# Patient Record
Sex: MALE | Race: WHITE | NOT HISPANIC OR LATINO | URBAN - METROPOLITAN AREA
[De-identification: names, ages, dates, MRNs, and addresses within clinical notes are randomized per-mention and may not be internally consistent; named-entity substitution may affect disease eponyms.]

---

## 2019-03-20 ENCOUNTER — EMERGENCY (EMERGENCY)
Facility: HOSPITAL | Age: 34
LOS: 1 days | Discharge: HOME | End: 2019-03-20
Admitting: EMERGENCY MEDICINE
Payer: SELF-PAY

## 2019-03-20 VITALS
DIASTOLIC BLOOD PRESSURE: 75 MMHG | RESPIRATION RATE: 18 BRPM | OXYGEN SATURATION: 97 % | WEIGHT: 149.91 LBS | TEMPERATURE: 98 F | HEART RATE: 80 BPM | SYSTOLIC BLOOD PRESSURE: 150 MMHG | HEIGHT: 65 IN
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
## 2019-03-20 DIAGNOSIS — S61.012A LACERATION WITHOUT FOREIGN BODY OF LEFT THUMB WITHOUT DAMAGE TO NAIL, INITIAL ENCOUNTER: ICD-10-CM
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
## 2019-03-20 DIAGNOSIS — Z23 ENCOUNTER FOR IMMUNIZATION: ICD-10-CM
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
## 2019-03-20 PROCEDURE — 90471 IMMUNIZATION ADMIN: CPT

## 2019-03-20 PROCEDURE — 12001 RPR S/N/AX/GEN/TRNK 2.5CM/<: CPT

## 2019-03-20 PROCEDURE — 99282 EMERGENCY DEPT VISIT SF MDM: CPT | Mod: 25

## 2019-03-20 PROCEDURE — 90715 TDAP VACCINE 7 YRS/> IM: CPT

## 2019-03-20 PROCEDURE — 99283 EMERGENCY DEPT VISIT LOW MDM: CPT | Mod: 25

## 2019-03-20 RX ORDER — TETANUS TOXOID, REDUCED DIPHTHERIA TOXOID AND ACELLULAR PERTUSSIS VACCINE, ADSORBED 5; 2.5; 8; 8; 2.5 [IU]/.5ML; [IU]/.5ML; UG/.5ML; UG/.5ML; UG/.5ML
0.5 SUSPENSION INTRAMUSCULAR ONCE
Qty: 0 | Refills: 0 | Status: COMPLETED | OUTPATIENT
Start: 2019-03-20 | End: 2019-03-20

## 2019-03-20 RX ORDER — LIDOCAINE HCL 20 MG/ML
10 VIAL (ML) INJECTION ONCE
Qty: 0 | Refills: 0 | Status: COMPLETED | OUTPATIENT
Start: 2019-03-20 | End: 2019-03-20

## 2019-03-20 RX ADMIN — TETANUS TOXOID, REDUCED DIPHTHERIA TOXOID AND ACELLULAR PERTUSSIS VACCINE, ADSORBED 0.5 MILLILITER(S): 5; 2.5; 8; 8; 2.5 SUSPENSION INTRAMUSCULAR at 15:11

## 2019-03-20 RX ADMIN — Medication 10 MILLILITER(S): at 15:13

## 2019-03-20 NOTE — ED PROVIDER NOTE - CLINICAL SUMMARY MEDICAL DECISION MAKING FREE TEXT BOX
1cm linear laceration over IP joint of L thumb. Tdap updated. Digital block performed for anesthesia and laceration closed without complication. Aluminum finger splint applied since laceration over IP joint. Work excuse provided. Will return in 1 week for suture removal. Strict return precautions given.

## 2019-03-20 NOTE — ED ADULT NURSE NOTE - CHPI ED NUR SYMPTOMS NEG
no drainage/no blood in mucus/no rectal pain/no vomiting/no fever/no chills/no purulent drainage/no redness

## 2019-03-20 NOTE — ED PROVIDER NOTE - OBJECTIVE STATEMENT
32yo RHD male with no pmhx presents with L thumb laceration while using hole saw today. No numbness or weakness. Has FROM. No other injuries. Last tetanus unknown.

## 2019-03-20 NOTE — ED PROVIDER NOTE - PHYSICAL EXAMINATION
VITAL SIGNS: I have reviewed nursing notes and confirm.  CONSTITUTIONAL: Well-developed; well-nourished; in no acute distress.   SKIN:  warm and dry, no acute rash.   HEAD:  normocephalic, atraumatic.  EYES: PERRL, EOM intact; conjunctiva and sclera clear.  ENT: No nasal discharge; airway clear.   NECK: Supple; non tender.  EXT: Normal ROM. No clubbing, cyanosis or edema. 2+ pulses to b/l ue/le.  NEURO: Alert, oriented, grossly unremarkable  PSYCH: Cooperative, mood and affect appropriate.    L hand: 1cm linear laceration over IP joint of L thumb. No evidence of tendon injury. Distal sensation intact. Cap refill <2 sec.

## 2019-03-20 NOTE — ED PROVIDER NOTE - NSFOLLOWUPINSTRUCTIONS_ED_ALL_ED_FT
Keep bandage in place for 24 hours then remove and wash with warm, soapy water. Repeat this each day.  Replace aluminum finger splint to prevent bending your thumb and popping the sutures.  Follow up in 1 week for suture removal.    Return to the Emergency department if you develop fever>100.4F, finger swelling, numbness, weakness or any other concerns

## 2025-07-07 ENCOUNTER — HOSPITAL ENCOUNTER (INPATIENT)
Dept: HOSPITAL 74 - JER | Age: 40
LOS: 2 days | Discharge: HOME | DRG: 720 | End: 2025-07-09
Attending: INTERNAL MEDICINE | Admitting: HOSPITALIST
Payer: SELF-PAY

## 2025-07-07 VITALS — BODY MASS INDEX: 26.9 KG/M2

## 2025-07-07 DIAGNOSIS — J01.90: ICD-10-CM

## 2025-07-07 DIAGNOSIS — D72.829: ICD-10-CM

## 2025-07-07 DIAGNOSIS — R61: ICD-10-CM

## 2025-07-07 DIAGNOSIS — H92.09: ICD-10-CM

## 2025-07-07 DIAGNOSIS — I24.89: ICD-10-CM

## 2025-07-07 DIAGNOSIS — J18.1: ICD-10-CM

## 2025-07-07 DIAGNOSIS — E87.3: ICD-10-CM

## 2025-07-07 DIAGNOSIS — F17.200: ICD-10-CM

## 2025-07-07 DIAGNOSIS — A41.89: Primary | ICD-10-CM

## 2025-07-07 DIAGNOSIS — R00.0: ICD-10-CM

## 2025-07-07 DIAGNOSIS — M54.2: ICD-10-CM

## 2025-07-07 LAB
ALBUMIN SERPL-MCNC: 3.6 G/DL (ref 3.4–5)
ALP SERPL-CCNC: 86 U/L (ref 45–117)
ALT SERPL-CCNC: 39 U/L (ref 13–61)
ANION GAP SERPL CALC-SCNC: 10 MMOL/L (ref 4–13)
APTT BLD: 29.9 SECONDS (ref 25.2–36.5)
AST SERPL-CCNC: 19 U/L (ref 15–37)
BASE EXCESS BLDV CALC-SCNC: 2 MMOL/L (ref -2–2)
BASOPHILS # BLD AUTO: 0.04 X10^3/UL (ref 0.01–0.08)
BILIRUB SERPL-MCNC: 1.3 MG/DL (ref 0.2–1)
BUN SERPL-MCNC: 11.3 MG/DL (ref 7–18)
CALCIUM SERPL-MCNC: 9.1 MG/DL (ref 8.5–10.1)
CHLORIDE SERPL-SCNC: 100 MMOL/L (ref 98–107)
CO2 SERPL-SCNC: 24 MMOL/L (ref 21–32)
CREAT SERPL-MCNC: 1.1 MG/DL (ref 0.55–1.3)
EOSINOPHIL # BLD AUTO: 0 X10^3/UL (ref 0.04–0.54)
EOSINOPHIL NFR BLD AUTO: 0 % (ref 0.8–7)
ERYTHROCYTE [DISTWIDTH] IN BLOOD: 11.7 % (ref 12–15.6)
GLUCOSE SERPL-MCNC: 140 MG/DL (ref 74–106)
HCT VFR BLD CALC: 40 % (ref 40.1–51)
HCT VFR BLDV CALC: 44 % (ref 35.4–49)
HGB BLD-MCNC: 14.1 G/DL (ref 13.7–17.5)
IMM GRANULOCYTES # BLD: 0.14 X10^3/UL (ref 0–0.03)
INR BLD: 1.41 (ref 0.83–1.09)
MCHC RBC-ENTMCNC: 35.3 G/DL (ref 32.3–36.5)
MCV RBC: 85.7 FL (ref 79–92.2)
MONOCYTES # BLD AUTO: 1.01 X10^3/UL (ref 0.3–0.82)
MONOCYTES NFR BLD AUTO: 4.6 % (ref 5.3–12.2)
PCO2 BLDV: 32.4 MMHG (ref 38–52)
PH BLDV: 7.5 [PH] (ref 7.31–7.41)
PLATELET # BLD AUTO: 255 X10^3/UL (ref 163–337)
PLATELETS.RETICULATED NFR BLD AUTO: (no result) % (ref 0.9–11.2)
PLATELETS.RETICULATED NFR BLD AUTO: (no result) X10^3/UL
PMV BLD: 8.7 FL (ref 9.4–12.4)
POTASSIUM SERPLBLD-SCNC: 3.8 MMOL/L (ref 3.5–5.1)
PROT SERPL-MCNC: 6.7 G/DL (ref 6.4–8.2)
PT PNL PPP: 15.5 SEC (ref 9.7–13)
SAO2 % BLDV: 57.1 % (ref 70–80)
SODIUM SERPL-SCNC: 133 MMOL/L (ref 136–145)

## 2025-07-07 PROCEDURE — G0378 HOSPITAL OBSERVATION PER HR: HCPCS

## 2025-07-07 RX ADMIN — ACETAMINOPHEN ONE MG: 325 TABLET ORAL at 18:43

## 2025-07-07 RX ADMIN — KETOROLAC TROMETHAMINE ONE MG: 30 INJECTION INTRAMUSCULAR; INTRAVENOUS at 22:03

## 2025-07-07 RX ADMIN — SODIUM CHLORIDE ONE ML: 9 INJECTION, SOLUTION INTRAVENOUS at 19:29

## 2025-07-07 RX ADMIN — AZITHROMYCIN DIHYDRATE ONE MLS/HR: 500 INJECTION, POWDER, LYOPHILIZED, FOR SOLUTION INTRAVENOUS at 22:03

## 2025-07-07 RX ADMIN — CEFTRIAXONE ONE MG: 500 INJECTION, POWDER, FOR SOLUTION INTRAMUSCULAR; INTRAVENOUS at 19:29

## 2025-07-07 RX ADMIN — DIAZEPAM ONE MG: 5 INJECTION, SOLUTION INTRAMUSCULAR; INTRAVENOUS at 18:43

## 2025-07-08 LAB
ALBUMIN SERPL-MCNC: 2.8 G/DL (ref 3.4–5)
ALP SERPL-CCNC: 68 U/L (ref 45–117)
ALT SERPL-CCNC: 30 U/L (ref 13–61)
ANION GAP SERPL CALC-SCNC: 7 MMOL/L (ref 4–13)
APPEARANCE UR: CLEAR
AST SERPL-CCNC: 13 U/L (ref 15–37)
BACTERIA # UR AUTO: (no result) /UL (ref 0–1359)
BASOPHILS # BLD AUTO: (no result) X10^3/UL (ref 0.01–0.08)
BILIRUB SERPL-MCNC: 0.7 MG/DL (ref 0.2–1)
BILIRUB UR STRIP.AUTO-MCNC: NEGATIVE MG/DL
BUN SERPL-MCNC: 15 MG/DL (ref 7–18)
CALCIUM SERPL-MCNC: 7.9 MG/DL (ref 8.5–10.1)
CASTS URNS QL MICRO: (no result) /UL (ref 0–3.1)
CHLORIDE SERPL-SCNC: 105 MMOL/L (ref 98–107)
CO2 SERPL-SCNC: 28 MMOL/L (ref 21–32)
COLOR UR: YELLOW
CREAT SERPL-MCNC: 1 MG/DL (ref 0.55–1.3)
CRYSTALS URNS QL MICRO: (no result) /HPF
EOSINOPHIL # BLD AUTO: (no result) X10^3/UL (ref 0.04–0.54)
EOSINOPHIL NFR BLD AUTO: (no result) % (ref 0.8–7)
EPITH CASTS URNS QL MICRO: (no result) /UL (ref 0–25.1)
ERYTHROCYTE [DISTWIDTH] IN BLOOD: 12 % (ref 12–15.6)
GLUCOSE SERPL-MCNC: 115 MG/DL (ref 74–106)
HCT VFR BLD CALC: 36.8 % (ref 40.1–51)
HCV IGG SERPL QL IA: (no result)
HGB BLD-MCNC: 12.9 G/DL (ref 13.7–17.5)
HIV 1+2 AB+HIV1 P24 AG SERPL QL IA: NEGATIVE
IMM GRANULOCYTES # BLD: (no result) X10^3/UL (ref 0–0.03)
KETONES UR QL STRIP: NEGATIVE
LEUKOCYTE ESTERASE UR QL STRIP.AUTO: NEGATIVE
MAGNESIUM SERPL-MCNC: 2.4 MG/DL (ref 1.8–2.4)
MCHC RBC-ENTMCNC: 35.1 G/DL (ref 32.3–36.5)
MCV RBC: 87.8 FL (ref 79–92.2)
MONOCYTES # BLD AUTO: (no result) X10^3/UL (ref 0.3–0.82)
MONOCYTES NFR BLD AUTO: (no result) % (ref 5.3–12.2)
NITRITE UR QL STRIP: NEGATIVE
PH UR: 8 [PH] (ref 5–8)
PHOSPHATE SERPL-MCNC: 1.3 MG/DL (ref 2.5–4.9)
PLATELET # BLD AUTO: 226 X10^3/UL (ref 163–337)
PLATELETS.RETICULATED NFR BLD AUTO: (no result) % (ref 0.9–11.2)
PLATELETS.RETICULATED NFR BLD AUTO: (no result) X10^3/UL
PMV BLD: 8.8 FL (ref 9.4–12.4)
POTASSIUM SERPLBLD-SCNC: 3.6 MMOL/L (ref 3.5–5.1)
PROT SERPL-MCNC: 5.5 G/DL (ref 6.4–8.2)
PROT UR QL STRIP: (no result)
PROT UR QL STRIP: NEGATIVE
RBC # BLD AUTO: (no result) /UL (ref 0–23.9)
SODIUM SERPL-SCNC: 140 MMOL/L (ref 136–145)
SP GR UR: 1.01 (ref 1.01–1.03)
UROBILINOGEN UR STRIP-MCNC: (no result) MG/DL (ref 0.2–1)
WBC # UR AUTO: (no result) /UL (ref 0–25.8)
YEAST BUDDING URNS QL: (no result)

## 2025-07-08 RX ADMIN — ACETAMINOPHEN PRN MG: 325 TABLET ORAL at 10:49

## 2025-07-08 RX ADMIN — SODIUM PHOSPHATE, MONOBASIC, MONOHYDRATE AND SODIUM PHOSPHATE, DIBASIC ANHYDROUS ONE MLS/HR: 276; 142 INJECTION, SOLUTION INTRAVENOUS at 11:34

## 2025-07-08 RX ADMIN — AZITHROMYCIN DIHYDRATE SCH MLS/HR: 500 INJECTION, POWDER, LYOPHILIZED, FOR SOLUTION INTRAVENOUS at 19:27

## 2025-07-08 RX ADMIN — CEFTRIAXONE SCH MLS/HR: 1 INJECTION, POWDER, FOR SOLUTION INTRAMUSCULAR; INTRAVENOUS at 18:16

## 2025-07-08 RX ADMIN — POTASSIUM PHOSPHATE, MONOBASIC AND POTASSIUM PHOSPHATE, DIBASIC ONE MLS/HR: 224; 236 INJECTION, SOLUTION, CONCENTRATE INTRAVENOUS at 21:25

## 2025-07-08 RX ADMIN — ENOXAPARIN SODIUM SCH MG: 40 INJECTION SUBCUTANEOUS at 09:44

## 2025-07-08 RX ADMIN — SODIUM CHLORIDE SCH MLS/HR: 9 INJECTION, SOLUTION INTRAVENOUS at 03:12

## 2025-07-09 VITALS
SYSTOLIC BLOOD PRESSURE: 116 MMHG | TEMPERATURE: 98.4 F | HEART RATE: 65 BPM | DIASTOLIC BLOOD PRESSURE: 86 MMHG | RESPIRATION RATE: 17 BRPM

## 2025-07-09 LAB
ANION GAP SERPL CALC-SCNC: 6 MMOL/L (ref 4–13)
BUN SERPL-MCNC: 12.7 MG/DL (ref 7–18)
CALCIUM SERPL-MCNC: 8 MG/DL (ref 8.5–10.1)
CHLORIDE SERPL-SCNC: 110 MMOL/L (ref 98–107)
CO2 SERPL-SCNC: 26 MMOL/L (ref 21–32)
CREAT SERPL-MCNC: 0.8 MG/DL (ref 0.55–1.3)
ERYTHROCYTE [DISTWIDTH] IN BLOOD: 12 % (ref 12–15.6)
GLUCOSE SERPL-MCNC: 98 MG/DL (ref 74–106)
HCT VFR BLD CALC: 36.2 % (ref 40.1–51)
HGB BLD-MCNC: 12.4 G/DL (ref 13.7–17.5)
MAGNESIUM SERPL-MCNC: 2.1 MG/DL (ref 1.8–2.4)
MCHC RBC-ENTMCNC: 34.3 G/DL (ref 32.3–36.5)
MCV RBC: 88.1 FL (ref 79–92.2)
PHOSPHATE SERPL-MCNC: 3.2 MG/DL (ref 2.5–4.9)
PLATELET # BLD AUTO: 234 X10^3/UL (ref 163–337)
PLATELETS.RETICULATED NFR BLD AUTO: (no result) % (ref 0.9–11.2)
PLATELETS.RETICULATED NFR BLD AUTO: (no result) X10^3/UL
PMV BLD: 9.1 FL (ref 9.4–12.4)
POTASSIUM SERPLBLD-SCNC: 4.2 MMOL/L (ref 3.5–5.1)
SODIUM SERPL-SCNC: 142 MMOL/L (ref 136–145)

## 2025-07-09 RX ADMIN — CEFTRIAXONE SODIUM SCH MLS/HR: 2 INJECTION, POWDER, FOR SOLUTION INTRAMUSCULAR; INTRAVENOUS at 10:41
